# Patient Record
Sex: FEMALE | Race: WHITE | NOT HISPANIC OR LATINO | URBAN - NONMETROPOLITAN AREA
[De-identification: names, ages, dates, MRNs, and addresses within clinical notes are randomized per-mention and may not be internally consistent; named-entity substitution may affect disease eponyms.]

---

## 2018-08-29 PROBLEM — H16.223: Noted: 2018-08-29

## 2018-08-29 PROBLEM — H35.3131: Noted: 2018-08-29

## 2018-08-29 PROBLEM — H52.4: Noted: 2018-08-29

## 2018-08-29 PROBLEM — H33.8: Noted: 2018-08-29

## 2018-08-29 PROBLEM — E11.3292: Noted: 2018-08-29

## 2018-08-29 PROBLEM — H25.13: Noted: 2018-08-29

## 2019-02-11 ENCOUNTER — IMPORTED ENCOUNTER (OUTPATIENT)
Dept: URBAN - NONMETROPOLITAN AREA CLINIC 1 | Facility: CLINIC | Age: 64
End: 2019-02-11

## 2019-02-11 PROCEDURE — 92250 FUNDUS PHOTOGRAPHY W/I&R: CPT

## 2019-02-11 PROCEDURE — 99213 OFFICE O/P EST LOW 20 MIN: CPT

## 2019-02-11 NOTE — PATIENT DISCUSSION
ARMD OU- Discussed with patient in detail today- Family history of ARMD with mother- OCT done previouslyshows dry stable ARMD OU- optos done today shows drusen OU but stable - Recommend using the [address] call or come into the office ASAP if any changes noted from today- Recommend taking eye vitamins such as Preservision daily- Continue to monitorIDDM 1999- Discussed diagnosis in detail with patient- Stressed importance of good blood sugar control- Recommend no soda's - No diabetic retinopathy seen on today's exam- Letter to [employer]- Continue to Margret OU- Discussed diagnosis in detail with patient- Discussed signs and symptoms of progression- Discussed V protection- Patient complains of trouble with glare- BAT done at last visit 20/50 OU- Would like to hold off on treatment at this time - Continue to monitorDES OU- Discussed diagnosis in detail with patient- Discussed signs and symptoms of progression- Recommend patient drinking plenty of water and starting Omega 3’s - Recommend Refresh or Systane  throughout the day- Consider Restasis or plugs in the future if no improvement- Continue to monitorOld retinal tear OS- Discussed diagnosis in detail with patient- Dx in 2012- Noted scar at 10 o clock OS- MonitorHyperopia / Astigmatism / Presbyopia OU - Discussed diagnosis in detail with patient- Continue to monitor; 's Notes: MR 8/29/18DFE CL update 9/10/18OCT mac 8/29/18Optos 2/11/19IVFA-

## 2019-08-19 ENCOUNTER — IMPORTED ENCOUNTER (OUTPATIENT)
Dept: URBAN - NONMETROPOLITAN AREA CLINIC 1 | Facility: CLINIC | Age: 64
End: 2019-08-19

## 2019-08-19 PROBLEM — H16.223: Noted: 2019-08-19

## 2019-08-19 PROBLEM — H35.3131: Noted: 2019-08-19

## 2019-08-19 PROBLEM — H40.013: Noted: 2019-08-19

## 2019-08-19 PROBLEM — E11.3292: Noted: 2019-08-19

## 2019-08-19 PROBLEM — H33.8: Noted: 2019-08-19

## 2019-08-19 PROBLEM — H52.4: Noted: 2019-08-19

## 2019-08-19 PROBLEM — H25.13: Noted: 2019-08-19

## 2019-08-19 PROCEDURE — 92310 CONTACT LENS FITTING OU: CPT

## 2019-08-19 PROCEDURE — V2520 CONTACT LENS HYDROPHILIC: HCPCS

## 2019-08-19 PROCEDURE — 92015 DETERMINE REFRACTIVE STATE: CPT

## 2019-08-19 PROCEDURE — 92014 COMPRE OPH EXAM EST PT 1/>: CPT

## 2019-08-19 NOTE — PATIENT DISCUSSION
Hyperopia / Astigmatism / Presbyopia OU - Discussed diagnosis in detail with patient- New glasses and CL RX given today- Continue to monitor Borderline Glaucoma OU- Discussed diagnosis in detail with patient- Family history of Glaucoma - IOP today 16 OU- Cup to Disc noted at OD . 4 and OS . 6 - Continue to monitor- RTC 3-4 months F/U BL GL VF OCT gonio and PACHY ARMD OU- Discussed with patient in detail today- Family history of ARMD with mother- OCT done previouslyshows dry stable ARMD OU- Optos done previously shows drusen OU but stable - Recommend using the [employer] call or come into the office ASAP if any changes noted from today- Recommend taking eye vitamins such as Preservision daily- Continue to monitorIDDM 1999- Discussed diagnosis in detail with patient- Stressed importance of good blood sugar control- Recommend no soda's - No diabetic retinopathy seen on today's exam- Letter to [employer]- [facility]- Discussed diagnosis in detail with patient- Discussed signs and symptoms of progression- Discussed V protection- Patient complains of trouble with glare- BAT done at last visit 20/50 OU- Would like to hold off on treatment at this time - Continue to monitorDES OU- Discussed diagnosis in detail with patient- Discussed signs and symptoms of progression- Recommend patient drinking plenty of water and starting Omega 3’s - Recommend Refresh or Systane  throughout the day- Consider Restasis or plugs in the future if no improvement- Continue to monitorOld retinal tear OS- Discussed diagnosis in detail with patient- Dx in 2012- Noted scar at 10 o clock OS- Monitor; 's Notes: MR 8/19/19DFE 8/19/19CL update 9/10/18OCT mac 8/29/18Optos 2/11/19IVFA-

## 2019-11-14 ENCOUNTER — IMPORTED ENCOUNTER (OUTPATIENT)
Dept: URBAN - NONMETROPOLITAN AREA CLINIC 1 | Facility: CLINIC | Age: 64
End: 2019-11-14

## 2019-11-14 PROBLEM — H40.013: Noted: 2019-11-14

## 2019-11-14 PROBLEM — H25.13: Noted: 2019-11-14

## 2019-11-14 PROBLEM — H35.3131: Noted: 2019-11-14

## 2019-11-14 PROBLEM — H16.223: Noted: 2019-11-14

## 2019-11-14 PROBLEM — E11.3292: Noted: 2019-11-14

## 2019-11-14 PROBLEM — H33.8: Noted: 2019-11-14

## 2019-11-14 PROCEDURE — 92014 COMPRE OPH EXAM EST PT 1/>: CPT

## 2019-11-14 PROCEDURE — 76514 ECHO EXAM OF EYE THICKNESS: CPT

## 2019-11-14 PROCEDURE — 92083 EXTENDED VISUAL FIELD XM: CPT

## 2019-11-14 PROCEDURE — 92133 CPTRZD OPH DX IMG PST SGM ON: CPT

## 2019-11-14 NOTE — PATIENT DISCUSSION
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 4 and OS . 6 - OCT done today shows No NFL thinning OU- VF done today OD shows Good field and and Normal and OS shows Good field and Normal - PACHY done today  and - Continue to monitorARMD OU- Discussed with patient in detail today- Family history of ARMD with mother- OCT done previouslyshows dry stable ARMD OU- Optos done previously shows drusen OU but stable - Recommend using the [address] call or come into the office ASAP if any changes noted from today- Recommend taking eye vitamins such as Preservision daily- Continue to monitorIDDM 1999- Discussed diagnosis in detail with patient- Stressed importance of good blood sugar control- Recommend no soda's - No diabetic retinopathy seen on today's exam- Letter to [employer]- Continue to Margret OU- Discussed diagnosis in detail with patient- Discussed signs and symptoms of progression- Discussed V protection- Patient complains of trouble with glare- BAT done at last visit 20/50 OU- Would like to hold off on treatment at this time - Continue to monitorDES OU- Discussed diagnosis in detail with patient- Discussed signs and symptoms of progression- Recommend patient drinking plenty of water and starting Omega 3’s - Recommend Refresh or Systane  throughout the day- Consider Restasis or plugs in the future if no improvement- Continue to monitorOld retinal tear OS- Discussed diagnosis in detail with patient- Dx in 2012- Noted scar at 10 o clock OS- Monitor; 's Notes: MR 8/19/19DFE 8/19/19CL update 9/10/18OCT mac 8/29/18Optos 2/11/19IVFA-OCT 11/14/19VF 11/14/19PACHY 11/14/19

## 2020-05-19 ENCOUNTER — IMPORTED ENCOUNTER (OUTPATIENT)
Dept: URBAN - NONMETROPOLITAN AREA CLINIC 1 | Facility: CLINIC | Age: 65
End: 2020-05-19

## 2020-05-19 PROCEDURE — 99213 OFFICE O/P EST LOW 20 MIN: CPT

## 2020-05-19 PROCEDURE — 92250 FUNDUS PHOTOGRAPHY W/I&R: CPT

## 2020-05-19 PROCEDURE — 92310 CONTACT LENS FITTING OU: CPT

## 2020-05-19 PROCEDURE — 92015 DETERMINE REFRACTIVE STATE: CPT

## 2020-05-19 NOTE — PATIENT DISCUSSION
Hyperopia / Astigmatism / Presbyopia OU - Discussed diagnosis in detail with patient- Continue to monitor Borderline Glaucoma OU- Discussed diagnosis in detail with patient- Family history of Glaucoma - IOP today OD 14 and OS 12- Cup to Disc noted at OD . 4 and OS . 6 - OCT done previously shows No NFL thinning OU- VF done previusly OD shows Good field and and Normal and OS shows Good field and Normal - Optos done today stable OU - PACHY done previously  and - Continue to monitorARMD OU- Discussed with patient in detail today- Family history of ARMD with mother- OCT done previouslyshows dry stable ARMD OU- Optos done previously shows drusen OU but stable - Recommend using the [address] call or come into the office ASAP if any changes noted from today- Recommend taking eye vitamins such as Preservision daily- Continue to monitorIDDM 1999- Discussed diagnosis in detail with patient- Stressed importance of good blood sugar control- Recommend no soda's - No diabetic retinopathy seen on today's exam- Letter to [employer]- Continue to Margret OU- Discussed diagnosis in detail with patient- Discussed signs and symptoms of progression- Discussed V protection- Patient complains of trouble with glare- BAT done at last visit 20/50 OU- Would like to hold off on treatment at this time - Continue to monitorDES OU- Discussed diagnosis in detail with patient- Discussed signs and symptoms of progression- Recommend patient drinking plenty of water and starting Omega 3’s - Recommend Refresh or Systane  throughout the day- Consider Restasis or plugs in the future if no improvement- Continue to monitorOld retinal tear OS- Discussed diagnosis in detail with patient- Dx in 2012- Noted scar at 10 o clock OS- Monitor; 's Notes: MR 8/19/19DFE 8/19/19CL update 9/10/18OCT mac 8/29/18Optos 5/19/20IVFA-OCT 11/14/19VF 11/14/19PACHY 11/14/19

## 2020-08-18 ENCOUNTER — IMPORTED ENCOUNTER (OUTPATIENT)
Dept: URBAN - NONMETROPOLITAN AREA CLINIC 1 | Facility: CLINIC | Age: 65
End: 2020-08-18

## 2020-08-18 PROBLEM — H33.8: Noted: 2019-11-14

## 2020-08-18 PROBLEM — H40.013: Noted: 2020-08-18

## 2020-08-18 PROBLEM — H16.223: Noted: 2019-11-14

## 2020-08-18 PROBLEM — H25.13: Noted: 2019-11-14

## 2020-08-18 PROBLEM — E11.3292: Noted: 2019-11-14

## 2020-08-18 PROBLEM — H35.3131: Noted: 2020-08-18

## 2020-08-18 PROCEDURE — V2520 CONTACT LENS HYDROPHILIC: HCPCS

## 2020-08-18 PROCEDURE — 92014 COMPRE OPH EXAM EST PT 1/>: CPT

## 2020-08-18 PROCEDURE — 92015 DETERMINE REFRACTIVE STATE: CPT

## 2020-08-18 PROCEDURE — 92310 CONTACT LENS FITTING OU: CPT

## 2020-08-18 PROCEDURE — 92134 CPTRZ OPH DX IMG PST SGM RTA: CPT

## 2020-08-18 NOTE — PATIENT DISCUSSION
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 4 and OS . 6 - OCT done previously shows No NFL thinning OU- VF done previusly OD shows Good field and and Normal and OS shows Good field and Normal - Optos done previously stable OU - PACHY done previously  and - Continue to monitorARMD OU- Discussed with patient in detail today- Family history of ARMD with mother- OCT done previouslyshows dry stable ARMD OU- Optos done previously shows drusen OU but stable - Recommend using the [address] call or come into the office ASAP if any changes noted from today- Recommend taking eye vitamins such as Preservision daily- Continue to monitorIDDM 1999- Discussed diagnosis in detail with patient- Stressed importance of good blood sugar control- Recommend no soda's - No diabetic retinopathy seen on today's exam- Letter to [employer]- Continue to Margret OU- Discussed diagnosis in detail with patient- Discussed signs and symptoms of progression- Discussed V protection- Patient complains of trouble with glare- BAT done at last visit 20/50 OU- Would like to hold off on treatment at this time - Continue to monitorDES OU- Discussed diagnosis in detail with patient- Discussed signs and symptoms of progression- Recommend patient drinking plenty of water and starting Omega 3’s - Recommend Refresh or Systane  throughout the day- Continue to monitorOld retinal tear OS- Discussed diagnosis in detail with patient- Dx in 2012- Noted scar at 10 o clock OS- Monitor; 's Notes: MR 8/18/20DFE 8/18/20CL update 9/10/18OCT mac 8/18/20Optos 5/19/20IVFA-OCT 11/14/19VF 11/14/19PACHY 11/14/19

## 2021-02-16 ENCOUNTER — IMPORTED ENCOUNTER (OUTPATIENT)
Dept: URBAN - NONMETROPOLITAN AREA CLINIC 1 | Facility: CLINIC | Age: 66
End: 2021-02-16

## 2021-02-16 NOTE — PATIENT DISCUSSION
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 4 and OS . 6 - OCT done previously shows No NFL thinning OU- VF done previusly OD shows Good field and and Normal and OS shows Good field and Normal - Optos done previously stable OU - PACHY done previously  and - Continue to monitorARMD OU- Discussed with patient in detail today- Family history of ARMD with mother- OCT done previouslyshows dry stable ARMD OU- Optos done previously shows drusen OU but stable - Recommend using the [address] call or come into the office ASAP if any changes noted from today- Recommend taking eye vitamins such as Preservision daily- Continue to monitorIDDM 1999- Discussed diagnosis in detail with patient- Stressed importance of good blood sugar control- Recommend no soda's - No diabetic retinopathy seen on today's exam- Letter to Catherine Ruelas- Continue to Margret OU- Discussed diagnosis in detail with patient- Discussed signs and symptoms of progression- Discussed V protection- Patient complains of trouble with glare- BAT done at last visit 20/50 OU- Would like to hold off on treatment at this time - Continue to monitorDES OU- Discussed diagnosis in detail with patient- Discussed signs and symptoms of progression- Recommend patient drinking plenty of water and starting Omega 3’s - Recommend Refresh or Systane  throughout the day- Continue to monitorOld retinal tear OS- Discussed diagnosis in detail with patient- Dx in 2012- Noted scar at 10 o clock OS- Monitor; 's Notes: MR 8/18/20<br />DFE 8/18/20<br />CL update 9/10/18<br />OCT mac 8/18/20<br />Optos 5/19/20<br />IVFA-<br />OCT 11/14/19<br />VF 11/14/19<br />PACHY 11/14/19<br />

## 2021-03-18 ENCOUNTER — IMPORTED ENCOUNTER (OUTPATIENT)
Dept: URBAN - NONMETROPOLITAN AREA CLINIC 1 | Facility: CLINIC | Age: 66
End: 2021-03-18

## 2021-03-18 PROCEDURE — 92014 COMPRE OPH EXAM EST PT 1/>: CPT

## 2021-03-18 PROCEDURE — V2520 CONTACT LENS HYDROPHILIC: HCPCS

## 2021-03-18 PROCEDURE — 92133 CPTRZD OPH DX IMG PST SGM ON: CPT

## 2021-03-18 PROCEDURE — 92083 EXTENDED VISUAL FIELD XM: CPT

## 2021-03-18 NOTE — PATIENT DISCUSSION
Hyperopia / Astigmatism / Presbyopia OU - Discussed diagnosis in detail with patient- Continue to monitor Borderline Glaucoma OU- Discussed diagnosis in detail with patient- Family history of Glaucoma - IOP today OD 16 and OS 16- Cup to Disc noted at OD . 4 and OS . 6 - OCT done today shows No NFL thinning OU- VF done today OD shows Good field and normal and OS shows good field and Normal- Optos done previously stable OU - PACHY done previously  and - Continue to monitorARMD OU- Discussed with patient in detail today- Family history of ARMD with mother- OCT done previously shows dry stable ARMD OU- Optos done previously shows drusen OU but stable - Recommend using the [address] [facility] [address] call or come into the office ASAP if any changes noted from today- Recommend taking eye vitamins such as Preservision daily- Continue to monitorIDDM 1999- Discussed diagnosis in detail with patient- Stressed importance of good blood sugar control- Recommend no soda's - No diabetic retinopathy seen on today's exam- Letter to [employer]- Continue to Margret OU- Discussed diagnosis in detail with patient- Discussed signs and symptoms of progression- Discussed V protection- Patient complains of trouble with glare- BAT done at last visit 20/50 OU- Would like to hold off on treatment at this time - Continue to monitorDES OU- Discussed diagnosis in detail with patient- Discussed signs and symptoms of progression- Recommend patient drinking plenty of water and starting Omega 3’s - Recommend Refresh or Systane  throughout the day- Continue to monitorOld retinal tear OS- Discussed diagnosis in detail with patient- Dx in 2012- Noted scar at 10 o clock OS- Monitor; 's Notes: MR 8/18/20DFE 8/18/20CL update 9/10/18OCT mac 8/18/20Optos 5/19/20IVFA-OCT 3/18/21VF 3/18/21PACHY 11/14/19

## 2021-09-22 ENCOUNTER — IMPORTED ENCOUNTER (OUTPATIENT)
Dept: URBAN - NONMETROPOLITAN AREA CLINIC 1 | Facility: CLINIC | Age: 66
End: 2021-09-22

## 2021-09-22 ENCOUNTER — PREPPED CHART (OUTPATIENT)
Dept: URBAN - NONMETROPOLITAN AREA CLINIC 1 | Facility: CLINIC | Age: 66
End: 2021-09-22

## 2021-09-22 PROBLEM — H25.813: Noted: 2021-09-22

## 2021-09-22 PROBLEM — H33.8: Noted: 2019-11-14

## 2021-09-22 PROBLEM — H16.223: Noted: 2019-11-14

## 2021-09-22 PROBLEM — H35.3131: Noted: 2020-08-18

## 2021-09-22 PROBLEM — E11.3292: Noted: 2019-11-14

## 2021-09-22 PROBLEM — H40.013: Noted: 2020-08-18

## 2021-09-22 PROCEDURE — 92310 CONTACT LENS FITTING OU: CPT

## 2021-09-22 PROCEDURE — 99214 OFFICE O/P EST MOD 30 MIN: CPT

## 2021-09-22 PROCEDURE — 92134 CPTRZ OPH DX IMG PST SGM RTA: CPT

## 2021-09-22 PROCEDURE — 92015 DETERMINE REFRACTIVE STATE: CPT

## 2021-09-22 NOTE — PATIENT DISCUSSION
Hyperopia / Astigmatism / Presbyopia OU - Discussed diagnosis in detail with patient- New glasses and CL RX given today- Continue to monitor Borderline Glaucoma OU- Discussed diagnosis in detail with patient- Family history of Glaucoma - IOP today OD 15 and OS 15- Cup to Disc noted at OD . 4 and OS . 6 - OCT done previously shows No NFL thinning OU- VF done previously OD shows Good field and normal and OS shows good field and Normal- Optos done previously stable OU - PACHY done previously  and - Continue to monitorARMD OU- Discussed with patient in detail today- Family history of ARMD with mother- OCT done today OU shows Drusen but stable at this time - Optos done previously shows drusen OU but stable - Recommend using the [address] call or come into the office ASAP if any changes noted from today- Recommend taking eye vitamins such as Preservision daily- Continue to monitorIDDM 1999- Discussed diagnosis in detail with patient- Stressed importance of good blood sugar control- Recommend no soda's - No diabetic retinopathy seen on today's exam- Letter to [employer]- Continue to monitorCataracts OU- Discussed diagnosis in detail with patient- Discussed signs and symptoms of progression- Discussed V protection- Patient complains of trouble with glare- BAT done at last visit 20/50 OU- Would like to hold off on treatment at this time - Continue to monitorDES OU- Discussed diagnosis in detail with patient- Discussed signs and symptoms of progression- Recommend patient drinking plenty of water and starting Omega 3’s - Recommend Refresh or Systane  throughout the day- Continue to monitorOld retinal tear OS- Discussed diagnosis in detail with patient- Dx in 2012- Noted scar at 10 o clock OS- Monitor; 's Notes: OH9/02/88LTY 8/22CL update 9/10/18OCT mac 9/22/21Optos 5/19/20IVFA-OCT 3/18/21VF 3/18/21PACHY 11/14/19

## 2022-03-24 ENCOUNTER — FOLLOW UP (OUTPATIENT)
Dept: URBAN - NONMETROPOLITAN AREA CLINIC 1 | Facility: CLINIC | Age: 67
End: 2022-03-24

## 2022-03-24 DIAGNOSIS — H40.013: ICD-10-CM

## 2022-03-24 PROCEDURE — 92083 EXTENDED VISUAL FIELD XM: CPT

## 2022-03-24 PROCEDURE — 92133 CPTRZD OPH DX IMG PST SGM ON: CPT

## 2022-03-24 PROCEDURE — 99214 OFFICE O/P EST MOD 30 MIN: CPT

## 2022-03-24 ASSESSMENT — VISUAL ACUITY
OD_SC: 20/50
OD_PH: 20/20-1
OS_SC: 20/30-1

## 2022-03-24 ASSESSMENT — TONOMETRY
OS_IOP_MMHG: 16
OD_IOP_MMHG: 16

## 2022-04-10 ASSESSMENT — VISUAL ACUITY
OS_SC: 20/50-
OS_SC: 20/50
OD_SC: 20/20-2
OD_CC: 20/30-
OS_SC: 20/25-1
OS_CC: 20/40
OS_SC: 20/25-1
OD_SC: 20/20-2
OS_SC: 20/25-1
OD_SC: 20/20-2
OD_PH: 20/25
OD_PH: 20/25
OU_SC: 20/25-2
OU_SC: 20/25-2
OD_PH: 20/20
OS_PH: 20/30
OD_CC: 20/40
OU_SC: 20/25-2
OS_PH: 20/25
OD_SC: 20/20-2
OS_CC: 20/25-1
OS_CC: 20/30-
OU_SC: 20/25-2
OD_CC: 20/50
OS_SC: 20/25-1
OD_CC: 20/30
OS_SC: 20/25-1
OD_SC: 20/20
OD_SC: 20/20-2
OS_CC: 20/30
OU_SC: 20/25-2
OD_SC: 20/40
OS_PH: 20/25
OS_SC: 20/50-2
OD_SC: 20/25-

## 2022-04-10 ASSESSMENT — TONOMETRY
OS_IOP_MMHG: 15
OD_IOP_MMHG: 14
OD_IOP_MMHG: 15
OD_IOP_MMHG: 16
OD_IOP_MMHG: 16
OS_IOP_MMHG: 16
OS_IOP_MMHG: 12
OS_IOP_MMHG: 16
OD_IOP_MMHG: 16
OS_IOP_MMHG: 16
OD_IOP_MMHG: 16
OS_IOP_MMHG: 16
OD_IOP_MMHG: 14
OS_IOP_MMHG: 12

## 2022-04-10 ASSESSMENT — PACHYMETRY
OD_CT_UM: 654; ADJ: THICK
OS_CT_UM: 626; ADJ: THICK
OD_CT_UM: 654; ADJ: THICK
OS_CT_UM: 626; ADJ: THICK

## 2022-09-27 ENCOUNTER — FOLLOW UP (OUTPATIENT)
Dept: URBAN - NONMETROPOLITAN AREA CLINIC 1 | Facility: CLINIC | Age: 67
End: 2022-09-27

## 2022-09-27 DIAGNOSIS — H35.3131: ICD-10-CM

## 2022-09-27 DIAGNOSIS — H40.013: ICD-10-CM

## 2022-09-27 PROCEDURE — 92134 CPTRZ OPH DX IMG PST SGM RTA: CPT

## 2022-09-27 PROCEDURE — 99214 OFFICE O/P EST MOD 30 MIN: CPT

## 2022-09-27 ASSESSMENT — TONOMETRY
OD_IOP_MMHG: 16
OS_IOP_MMHG: 16

## 2022-09-27 ASSESSMENT — VISUAL ACUITY
OS_CC: 20/25
OD_CC: 20/25

## 2022-09-27 NOTE — PATIENT DISCUSSION
Retinal exam findings communicated to Physician managing diabetes: Dr. Matt Cadena in [place] [id number].

## 2023-09-29 ENCOUNTER — FOLLOW UP (OUTPATIENT)
Dept: URBAN - NONMETROPOLITAN AREA CLINIC 1 | Facility: CLINIC | Age: 68
End: 2023-09-29

## 2023-09-29 DIAGNOSIS — E11.3292: ICD-10-CM

## 2023-09-29 DIAGNOSIS — H35.3131: ICD-10-CM

## 2023-09-29 PROCEDURE — 92134 CPTRZ OPH DX IMG PST SGM RTA: CPT

## 2023-09-29 PROCEDURE — 99214 OFFICE O/P EST MOD 30 MIN: CPT

## 2023-09-29 ASSESSMENT — VISUAL ACUITY
OU_CC: 20/30+1
OU_CC: 20/25+1

## 2023-09-29 ASSESSMENT — TONOMETRY
OD_IOP_MMHG: 16
OS_IOP_MMHG: 16

## 2024-04-01 ENCOUNTER — FOLLOW UP (OUTPATIENT)
Dept: URBAN - NONMETROPOLITAN AREA CLINIC 1 | Facility: CLINIC | Age: 69
End: 2024-04-01

## 2024-04-01 DIAGNOSIS — H52.4: ICD-10-CM

## 2024-04-01 DIAGNOSIS — H25.811: ICD-10-CM

## 2024-04-01 DIAGNOSIS — E11.3292: ICD-10-CM

## 2024-04-01 DIAGNOSIS — H40.013: ICD-10-CM

## 2024-04-01 DIAGNOSIS — H35.3131: ICD-10-CM

## 2024-04-01 PROCEDURE — 92250 FUNDUS PHOTOGRAPHY W/I&R: CPT

## 2024-04-01 PROCEDURE — 92310-E CONTACT LENS FITTING ESTABLISH PATIENT

## 2024-04-01 PROCEDURE — 99213 OFFICE O/P EST LOW 20 MIN: CPT

## 2024-04-01 PROCEDURE — 92015 DETERMINE REFRACTIVE STATE: CPT

## 2024-04-01 ASSESSMENT — TONOMETRY
OS_IOP_MMHG: 17
OD_IOP_MMHG: 17

## 2024-04-01 ASSESSMENT — VISUAL ACUITY
OS_CC: 20/30-1
OS_BAT: 20/50
OD_CC: 20/25-2
OD_BAT: 20/50

## 2024-10-01 ENCOUNTER — FOLLOW UP (OUTPATIENT)
Dept: URBAN - NONMETROPOLITAN AREA CLINIC 1 | Facility: CLINIC | Age: 69
End: 2024-10-01

## 2024-10-01 DIAGNOSIS — Z79.4: ICD-10-CM

## 2024-10-01 DIAGNOSIS — H35.3131: ICD-10-CM

## 2024-10-01 DIAGNOSIS — H40.013: ICD-10-CM

## 2024-10-01 DIAGNOSIS — H25.811: ICD-10-CM

## 2024-10-01 DIAGNOSIS — E11.3292: ICD-10-CM

## 2024-10-01 DIAGNOSIS — H16.223: ICD-10-CM

## 2024-10-01 PROCEDURE — 92083 EXTENDED VISUAL FIELD XM: CPT

## 2024-10-01 PROCEDURE — 92133 CPTRZD OPH DX IMG PST SGM ON: CPT

## 2024-10-01 PROCEDURE — 99213 OFFICE O/P EST LOW 20 MIN: CPT

## 2025-03-31 ENCOUNTER — FOLLOW UP (OUTPATIENT)
Age: 70
End: 2025-03-31

## 2025-03-31 DIAGNOSIS — H52.4: ICD-10-CM

## 2025-03-31 DIAGNOSIS — H25.811: ICD-10-CM

## 2025-03-31 DIAGNOSIS — E11.9: ICD-10-CM

## 2025-03-31 DIAGNOSIS — H40.013: ICD-10-CM

## 2025-03-31 DIAGNOSIS — H35.3131: ICD-10-CM

## 2025-03-31 DIAGNOSIS — G43.B0: ICD-10-CM

## 2025-03-31 PROCEDURE — 92015 DETERMINE REFRACTIVE STATE: CPT

## 2025-03-31 PROCEDURE — 92134 CPTRZ OPH DX IMG PST SGM RTA: CPT

## 2025-03-31 PROCEDURE — 99214 OFFICE O/P EST MOD 30 MIN: CPT

## 2025-03-31 PROCEDURE — 92310-1 LEVEL 1 SOFT LENS UPDATE

## 2025-08-06 ENCOUNTER — CONSULTATION/EVALUATION (OUTPATIENT)
Age: 70
End: 2025-08-06

## 2025-08-06 DIAGNOSIS — H35.3131: ICD-10-CM

## 2025-08-06 DIAGNOSIS — H25.813: ICD-10-CM

## 2025-08-06 DIAGNOSIS — H40.013: ICD-10-CM

## 2025-08-06 DIAGNOSIS — Z79.4: ICD-10-CM

## 2025-08-06 DIAGNOSIS — E11.9: ICD-10-CM

## 2025-08-06 PROCEDURE — 92134 CPTRZ OPH DX IMG PST SGM RTA: CPT

## 2025-08-06 PROCEDURE — 99214 OFFICE O/P EST MOD 30 MIN: CPT

## 2025-08-06 PROCEDURE — 92136 OPHTHALMIC BIOMETRY: CPT

## 2025-08-06 PROCEDURE — 92025 CPTRIZED CORNEAL TOPOGRAPHY: CPT | Mod: NC
